# Patient Record
Sex: FEMALE | Race: WHITE | NOT HISPANIC OR LATINO | Employment: OTHER | URBAN - METROPOLITAN AREA
[De-identification: names, ages, dates, MRNs, and addresses within clinical notes are randomized per-mention and may not be internally consistent; named-entity substitution may affect disease eponyms.]

---

## 2020-02-24 ENCOUNTER — OFFICE VISIT (OUTPATIENT)
Dept: URGENT CARE | Facility: PHYSICIAN GROUP | Age: 60
End: 2020-02-24
Payer: MEDICARE

## 2020-02-24 VITALS
DIASTOLIC BLOOD PRESSURE: 86 MMHG | RESPIRATION RATE: 14 BRPM | HEART RATE: 80 BPM | HEIGHT: 68 IN | SYSTOLIC BLOOD PRESSURE: 122 MMHG | BODY MASS INDEX: 33.34 KG/M2 | TEMPERATURE: 98.4 F | WEIGHT: 220 LBS | OXYGEN SATURATION: 95 %

## 2020-02-24 DIAGNOSIS — K04.7 ABSCESSED TOOTH: Primary | ICD-10-CM

## 2020-02-24 PROCEDURE — 99214 OFFICE O/P EST MOD 30 MIN: CPT | Performed by: PHYSICIAN ASSISTANT

## 2020-02-24 RX ORDER — METOPROLOL SUCCINATE 25 MG/1
25 TABLET, EXTENDED RELEASE ORAL DAILY
COMMUNITY

## 2020-02-24 RX ORDER — AMOXICILLIN AND CLAVULANATE POTASSIUM 875; 125 MG/1; MG/1
1 TABLET, FILM COATED ORAL 2 TIMES DAILY
Qty: 14 TAB | Refills: 0 | Status: SHIPPED | OUTPATIENT
Start: 2020-02-24 | End: 2020-03-02

## 2020-02-24 RX ORDER — DICLOFENAC SODIUM 75 MG/1
75 TABLET, DELAYED RELEASE ORAL 2 TIMES DAILY
Qty: 60 TAB | Refills: 0 | Status: SHIPPED | OUTPATIENT
Start: 2020-02-24

## 2020-02-24 NOTE — PATIENT INSTRUCTIONS
Abscessed Tooth  An abscessed tooth is an infection around your tooth. It may be caused by holes or damage to the tooth (cavity) or a dental disease. An abscessed tooth causes mild to very bad pain in and around the tooth. See your dentist right away if you have tooth or gum pain.  HOME CARE  · Take your medicine as told. Finish it even if you start to feel better.  · Do not drive after taking pain medicine.  · Rinse your mouth (gargle) often with salt water (¼ teaspoon salt in 8 ounces of warm water).  · Do not apply heat to the outside of your face.  GET HELP RIGHT AWAY IF:   · You have a temperature by mouth above 102° F (38.9° C), not controlled by medicine.  · You have chills and a very bad headache.  · You have problems breathing or swallowing.  · Your mouth will not open.  · You develop puffiness (swelling) on the neck or around the eye.  · Your pain is not helped by medicine.  · Your pain is getting worse instead of better.  MAKE SURE YOU:   · Understand these instructions.  · Will watch your condition.  · Will get help right away if you are not doing well or get worse.  Document Released: 06/05/2009 Document Revised: 03/11/2013 Document Reviewed: 03/27/2012  [employer] Patient Information ©2014 [employer], [place].

## 2020-02-24 NOTE — PROGRESS NOTES
Subjective:      Pt is a 59 y.o. female who presents with tooth abscess          HPI  This is a new problem. Pt notes left lower tooth infection x 3 days. Pt has not taken any Rx medications for this condition. Pt to see a dentist later this week.  Pt states the pain is a 9/10, aching in nature and worse at night. Pt denies CP, SOB, NVD, paresthesias, headaches, dizziness, change in vision, hives, or other joint pain. The pt's medication list, problem list, and allergies have been evaluated and reviewed during today's visit.    PMH:  Past Medical History:   Diagnosis Date   • Arthritis    • Dental disorder     top dentures   • depression     counseling   • Heart burn    • Hypertension    • Pain     right knee,pain scale 4-6   • Rheumatic fever     as a child   • Seizure (HCC) 1986    grand mal   • Seizure (HCC) 1986       PSH:  Past Surgical History:   Procedure Laterality Date   • KNEE REVISION TOTAL  10/15/2009    Performed by DEANNA WOOD at SURGERY [facility] ORS   • COMPONENT REMOVAL  9/10/2009    Performed by DEANNA WOOD at SURGERY [facility]   • KNEE REVISION TOTAL  10/28/08    Performed by DEANNA WOOD at SURGERY [facility]   • KNEE ARTHROPLASTY TOTAL  2007    right   • LATERAL RELEASE  2006    right    • KNEE ARTHROSCOPY  2004    right   • HYSTERECTOMY, TOTAL ABDOMINAL  2001   • PRIMARY C SECTION  1999   • TONSILLECTOMY  1968   • CARPAL TUNNEL RELEASE      2001,2003   • HYSTERECTOMY RADICAL      2003   • KNEE REPLACEMENT, TOTAL      2007       Fam Hx:  the patient's family history is not pertinent to their current complaint      Soc HX:  Social History     Socioeconomic History   • Marital status:      Spouse name: Not on file   • Number of children: Not on file   • Years of education: Not on file   • Highest education level: Not on file   Occupational History   • Not on file   Social Needs   • Financial resource strain: Not on file   • Food insecurity     Worry: Not on file    Inability: Not on file   • Transportation needs     Medical: Not on file     Non-medical: Not on file   Tobacco Use   • Smoking status: Former Smoker   • Smokeless tobacco: Never Used   • Tobacco comment: .5-1 ppd,25 yrs,quit 2005   Substance and Sexual Activity   • Alcohol use: Yes     Comment: 1 per week   • Drug use: No   • Sexual activity: Not on file   Lifestyle   • Physical activity     Days per week: Not on file     Minutes per session: Not on file   • Stress: Not on file   Relationships   • Social connections     Talks on phone: Not on file     Gets together: Not on file     Attends Denominational service: Not on file     Active member of club or organization: Not on file     Attends meetings of clubs or organizations: Not on file     Relationship status: Not on file   • Intimate partner violence     Fear of current or ex partner: Not on file     Emotionally abused: Not on file     Physically abused: Not on file     Forced sexual activity: Not on file   Other Topics Concern   • Not on file   Social History Narrative   • Not on file         Medications:    Current Outpatient Medications:   •  metoprolol SR (TOPROL XL) 25 MG TABLET SR 24 HR, Take 25 mg by mouth every day., Disp: , Rfl:   •  amoxicillin-clavulanate (AUGMENTIN) 875-125 MG Tab, Take 1 Tab by mouth 2 times a day for 7 days., Disp: 14 Tab, Rfl: 0  •  diclofenac EC (VOLTAREN) 75 MG Tablet Delayed Response, Take 1 Tab by mouth 2 times a day., Disp: 60 Tab, Rfl: 0  •  albuterol (PROVENTIL) 2.5mg/3ml NEBU, 3 mL by Nebulization route every four hours as needed for Shortness of Breath., Disp: 75 mL, Rfl: 3  •  TRIAMTERENE/HCTZ 37.5-25 MG CAPS, Take  by mouth every day., Disp: , Rfl:   •  ALBUTEROL, by Does not apply route as needed., Disp: , Rfl:   •  Oxycodone-Acetaminophen (PERCOCET)  MG TABS, Take 1 Tab by mouth every four hours as needed for Moderate Pain., Disp: , Rfl:   •  diazepam (VALIUM) 5 MG TABS, Take 5 mg by mouth 3 times a day as needed for  Anxiety., Disp: , Rfl:   •  morphine SR (MS CONTIN) 30 MG TB12, Take 30 mg by mouth every 12 hours., Disp: , Rfl:   •  enoxaparin (LOVENOX) SOLN, Inject 40 mg as instructed every day. [id number] , Disp: , Rfl:   •  ALPRAZOLAM 0.5 MG TABS, Take  by mouth as needed. Takes one AM and one PM, Disp: , Rfl:   •  ESTRADIOL 1 MG TABS, Take  by mouth., Disp: , Rfl:   •  TUMS PO, Take  by mouth every day., Disp: , Rfl:   •  CALCIUM PO, Take  by mouth., Disp: , Rfl:   •  DAILY MULTIVITAMIN PO, Take  by mouth., Disp: , Rfl:   •  ENALAPRIL 10 MG TABS, 2 Times a Day., Disp: , Rfl:       Allergies:  Carisoprodol; Promethazine; and Trazodone    ROS    Constitutional: Negative for fever, chills and malaise/fatigue.   HENT: Negative for congestion and sore throat.  +tooth abscess  Eyes: Negative for blurred vision, double vision and photophobia.   Respiratory: Negative for cough and shortness of breath.  Cardiovascular: Negative for chest pain and palpitations.   Gastrointestinal: Negative for heartburn, nausea, vomiting, abdominal pain, diarrhea and constipation.   Genitourinary: Negative for dysuria and flank pain.   Musculoskeletal: Negative for joint pain and myalgias.   Skin: Negative for itching and rash.   Neurological: Negative for dizziness, tingling and headaches.   Endo/Heme/Allergies: Does not bruise/bleed easily.   Psychiatric/Behavioral: Negative for depression. The patient is not nervous/anxious.         Objective:     VSS, AF    Physical Exam  HENT:      Mouth/Throat:      Lips: Pink.      Mouth: Mucous membranes are moist. No injury, lacerations or oral lesions.      Dentition: Abnormal dentition. Has dentures. Dental tenderness, gingival swelling, dental caries and dental abscesses present. No gum lesions.      Tongue: No lesions. Tongue does not deviate from midline.      Palate: No mass and lesions.      Pharynx: Oropharynx is clear.             Constitutional: PT is oriented to person, place, and time. PT appears  well-developed and well-nourished. No distress.   HENT:   Head: Normocephalic and atraumatic.   Eyes: Conjunctivae normal and EOM are normal. Pupils are equal, round, and reactive to light.   Neck: Normal range of motion. Neck supple. No thyromegaly present.   Cardiovascular: Normal rate, regular rhythm, normal heart sounds and intact distal pulses.  Exam reveals no gallop and no friction rub.    No murmur heard.  Pulmonary/Chest: Effort normal and breath sounds normal. No respiratory distress. PT has no wheezes. PT has no rales. Pt exhibits no tenderness.   Abdominal: Soft. Bowel sounds are normal. PT exhibits no distension and no mass. There is no tenderness. There is no rebound and no guarding.   Musculoskeletal: Normal range of motion. PT exhibits no edema and no tenderness.   Neurological: PT is alert and oriented to person, place, and time. PT has normal reflexes. No cranial nerve deficit.   Skin: Skin is warm and dry. No rash noted. PT is not diaphoretic. No erythema.       Psychiatric: PT has a normal mood and affect. PT behavior is normal. Judgment and thought content normal.          Assessment/Plan:       1. Abscessed tooth    - amoxicillin-clavulanate (AUGMENTIN) 875-125 MG Tab; Take 1 Tab by mouth 2 times a day for 7 days.  Dispense: 14 Tab; Refill: 0  - diclofenac EC (VOLTAREN) 75 MG Tablet Delayed Response; Take 1 Tab by mouth 2 times a day.  Dispense: 60 Tab; Refill: 0    Follow up with dentistry this week  Rest, fluids encouraged.  AVS with medical info given.  Pt was in full understanding and agreement with the plan.  Differential diagnosis, natural history, supportive care, and indications for immediate follow-up discussed. All questions answered. Patient agrees with the plan of care.  Follow-up as needed if symptoms worsen or fail to improve to PCP, Urgent care or Emergency Room.

## 2021-04-27 ENCOUNTER — IMMUNIZATION (OUTPATIENT)
Dept: FAMILY PLANNING/WOMEN'S HEALTH CLINIC | Facility: IMMUNIZATION CENTER | Age: 61
End: 2021-04-27
Payer: MEDICARE

## 2021-04-27 DIAGNOSIS — Z23 ENCOUNTER FOR VACCINATION: Primary | ICD-10-CM

## 2021-04-27 PROCEDURE — 0001A PFIZER SARS-COV-2 VACCINE: CPT

## 2021-04-27 PROCEDURE — 91300 PFIZER SARS-COV-2 VACCINE: CPT

## 2021-04-30 ENCOUNTER — NURSE TRIAGE (OUTPATIENT)
Dept: HEALTH INFORMATION MANAGEMENT | Facility: OTHER | Age: 61
End: 2021-04-30

## 2021-04-30 NOTE — TELEPHONE ENCOUNTER
"Covid vaccination questions.  Pfizer vaccine 1st dose on 04/27.  Headache, fever, sore arm. Pt is wondering if she should get the second vaccine.  D2 scheduled for 05/21    Reason for Disposition  • Mild immunization reaction    Additional Information  • Negative: Difficulty with breathing or swallowing starts within 2 hours after injection  • Negative: Difficult to awaken or acting confused (e.g., disoriented, slurred speech)  • Negative: Unresponsive, passed out, or very weak  • Negative: Sounds like a life-threatening emergency to the triager  • Negative: Sounds like a severe, unusual reaction to the triager  • Negative: Fever > 103 F (39.4 C)  • Negative: Fever > 101 F (38.3 C) and over 60 years of age  • Negative: Fever > 100.0 F (37.8 C) and has diabetes mellitus or a weak immune system (e.g., HIV positive, cancer chemotherapy, organ transplant, splenectomy, chronic steroids)  • Negative: Fever > 100.0 F (37.8 C) and bedridden (e.g., nursing home patient, stroke, chronic illness, recovering from surgery)  • Negative: Measles vaccine and purple/blood-colored rash (onset day 6-12)  • Negative: Redness or red streak around the injection site begins > 48 hours after shot  • Negative: Fever present > 3 days (72 hours)  • Negative: Smallpox vaccine and eye pain, eye redness, or rash on eyelids  • Negative: Deep lump follows (in 2 to 8 weeks) Td or TDaP shot, and becomes tender to the touch  • Negative: Patient wants to be seen    Answer Assessment - Initial Assessment Questions  1. SYMPTOMS: \"What is the main symptom?\" (e.g., redness, swelling, pain)       Fever, chills, headache, sore arm  2. ONSET: \"When was the vaccine (shot) given?\" \"How much later did the symptoms begin?\" (e.g., hours, days ago)       12 hours  3. SEVERITY: \"How bad is it?\"       Does not want to go through the illness again  4. FEVER: \"Is there a fever?\" If so, ask: \"What is it, how was it measured, and when did it start?\"       Fever is broke " "now after 3 days  5. IMMUNIZATIONS GIVEN: \"What shots have you recently received?\"      Pfizer 04/27  6. PAST REACTIONS: \"Have you reacted to immunizations before?\" If so, ask: \"What happened?\"      no  7. OTHER SYMPTOMS: \"Do you have any other symptoms?\"      no    Protocols used: IMMUNIZATION [id number]      "

## 2021-05-21 ENCOUNTER — IMMUNIZATION (OUTPATIENT)
Dept: FAMILY PLANNING/WOMEN'S HEALTH CLINIC | Facility: IMMUNIZATION CENTER | Age: 61
End: 2021-05-21
Payer: MEDICARE

## 2021-05-21 DIAGNOSIS — Z23 ENCOUNTER FOR VACCINATION: Primary | ICD-10-CM

## 2021-05-21 PROCEDURE — 0002A PFIZER SARS-COV-2 VACCINE: CPT

## 2021-05-21 PROCEDURE — 91300 PFIZER SARS-COV-2 VACCINE: CPT
